# Patient Record
Sex: MALE
[De-identification: names, ages, dates, MRNs, and addresses within clinical notes are randomized per-mention and may not be internally consistent; named-entity substitution may affect disease eponyms.]

---

## 2019-09-17 ENCOUNTER — HOSPITAL ENCOUNTER (OUTPATIENT)
Dept: HOSPITAL 92 - BICULT | Age: 24
End: 2019-09-17
Attending: NURSE PRACTITIONER
Payer: COMMERCIAL

## 2019-09-17 DIAGNOSIS — N50.811: Primary | ICD-10-CM

## 2019-09-17 PROCEDURE — 76870 US EXAM SCROTUM: CPT

## 2019-09-17 PROCEDURE — 93976 VASCULAR STUDY: CPT

## 2019-09-17 NOTE — ULT
Scrotal sonogram with duplex evaluation



HISTORY: Right testicular pain.



FINDINGS: Right testicle is 4.3 cm greatest length and the left is 4.2 cm. Each has a normal appearan
ce with good color and spectral Doppler flow. Oval cyst of the left epididymal head measures up to

1.1 cm.











IMPRESSION: No evidence of testicular mass or torsion.



Incidental note of left epididymal head cyst.



Reported By: ROSLYN Bergeron 

Electronically Signed:  9/17/2019 2:51 PM

## 2021-04-30 ENCOUNTER — HOSPITAL ENCOUNTER (OUTPATIENT)
Dept: HOSPITAL 92 - BICULT | Age: 26
Discharge: HOME | End: 2021-04-30
Attending: NURSE PRACTITIONER
Payer: COMMERCIAL

## 2021-04-30 DIAGNOSIS — R10.31: Primary | ICD-10-CM

## 2021-04-30 PROCEDURE — 76705 ECHO EXAM OF ABDOMEN: CPT
